# Patient Record
Sex: FEMALE | URBAN - METROPOLITAN AREA
[De-identification: names, ages, dates, MRNs, and addresses within clinical notes are randomized per-mention and may not be internally consistent; named-entity substitution may affect disease eponyms.]

---

## 2021-07-23 ENCOUNTER — APPOINTMENT (OUTPATIENT)
Dept: URBAN - METROPOLITAN AREA CLINIC 264 | Age: 56
Setting detail: DERMATOLOGY
End: 2021-07-23

## 2021-07-23 DIAGNOSIS — Z41.9 ENCOUNTER FOR PROCEDURE FOR PURPOSES OTHER THAN REMEDYING HEALTH STATE, UNSPECIFIED: ICD-10-CM

## 2021-07-23 PROCEDURE — OTHER DYSPORT: OTHER

## 2021-07-23 PROCEDURE — OTHER ADDITIONAL NOTES: OTHER

## 2021-07-23 PROCEDURE — OTHER MEDICAL CONSULTATION: DYSPORT: OTHER

## 2021-07-23 NOTE — PROCEDURE: DYSPORT
Price (Use Numbers Only, No Special Characters Or $): 373 Price (Use Numbers Only, No Special Characters Or $): 132

## 2021-07-23 NOTE — PROCEDURE: ADDITIONAL NOTES
Additional Notes: Given F&F discount at checkout per Allen (). Galicia in chart reflects pre-discount galicia, checked out by Savita Sawyer.
Detail Level: Simple
Render Risk Assessment In Note?: no

## 2023-07-05 NOTE — PROCEDURE: DYSPORT
Consent: Written consent obtained. Risks include but not limited to lid/brow ptosis, bruising, swelling, diplopia, temporary effect, incomplete chemical denervation. Cibinqo Pregnancy And Lactation Text: It is unknown if this medication will adversely affect pregnancy or breast feeding.  You should not take this medication if you are currently pregnant or planning a pregnancy or while breastfeeding.